# Patient Record
Sex: MALE | Race: WHITE
[De-identification: names, ages, dates, MRNs, and addresses within clinical notes are randomized per-mention and may not be internally consistent; named-entity substitution may affect disease eponyms.]

---

## 2017-01-23 ENCOUNTER — HOSPITAL ENCOUNTER (OUTPATIENT)
Dept: HOSPITAL 19 - COL.LAB | Age: 72
End: 2017-01-23
Attending: ORTHOPAEDIC SURGERY
Payer: MEDICARE

## 2017-01-23 DIAGNOSIS — M17.12: ICD-10-CM

## 2017-01-23 DIAGNOSIS — Z01.812: Primary | ICD-10-CM

## 2017-04-24 ENCOUNTER — HOSPITAL ENCOUNTER (OUTPATIENT)
Dept: HOSPITAL 19 - COL.RAD | Age: 72
End: 2017-04-24
Attending: INTERNAL MEDICINE
Payer: MEDICARE

## 2017-04-24 DIAGNOSIS — K22.8: ICD-10-CM

## 2017-04-24 DIAGNOSIS — R63.4: Primary | ICD-10-CM

## 2017-04-24 DIAGNOSIS — K44.9: ICD-10-CM

## 2017-11-06 ENCOUNTER — HOSPITAL ENCOUNTER (OUTPATIENT)
Dept: HOSPITAL 19 - MKS.ESL.PT | Age: 72
LOS: 73 days | Discharge: HOME | End: 2018-01-18
Attending: PSYCHIATRY & NEUROLOGY
Payer: MEDICARE

## 2017-11-06 DIAGNOSIS — E11.42: Primary | ICD-10-CM

## 2017-11-30 ENCOUNTER — HOSPITAL ENCOUNTER (OUTPATIENT)
Dept: HOSPITAL 19 - MKS.ESL.PT | Age: 72
LOS: 49 days | Discharge: HOME | End: 2018-01-18
Attending: PSYCHIATRY & NEUROLOGY
Payer: MEDICARE

## 2017-11-30 DIAGNOSIS — E11.42: Primary | ICD-10-CM

## 2019-11-20 ENCOUNTER — HOSPITAL ENCOUNTER (OUTPATIENT)
Dept: HOSPITAL 19 - COL.RAD | Age: 74
End: 2019-11-20
Attending: ORTHOPAEDIC SURGERY
Payer: MEDICARE

## 2019-11-20 DIAGNOSIS — M25.512: Primary | ICD-10-CM

## 2024-06-11 NOTE — NUR
An Electronic Health Record (EHR) downtime event occurred during this patient
s care.  For legal medical record information generated during the downtime
period, please reference the patient s legal record information generated
during the downtime period, please reference the patient s legal medical
record.  Paper or scanned documentation has been incorporated into the legal
medical record which is maintained in accordance with Health Information
Management (HIM) and record retention policies

## 2024-06-11 NOTE — NUR
Pt doing okay this morning.  He was sleeping during bedside report.  Pts
daughter and wife are at bedside.  Discussed home medications with them,
updated med rec in the computer.  Pt reports not having any pain.  He does
state that he does not have much of an appetite right now but to leave his
breakfast for now.  Discussed plan of care and answered all questions.

## 2024-06-11 NOTE — NUR
and  student, Katie met with patient and his family to
complete initial intake. Patient's wife, Carey (ph#[phone]) and
daughter, Lucila are at bedside. Patient lives in [place] and sees Dr. Carr
for primary care. Patient does not use any DME and is independent with [employer],
including driving. Patient advised he has a trust that includes [facility],
designating his wife Carey. Patient plans to return home at time of
discharge.
 
Discharge Plan: Home

## 2024-06-11 NOTE — NUR
PT IN BED, SLIGHT HARD OF HEARING. IVF INFUSING TO LT AC WITHOUT PROBLEM. HS
MEDS GIVEN. DENIES PAIN AT THIS TIME. CALLS APPROPRIATELY FOR ASSIST TO
BATHROOM, BED ALARM ON. REMAINS ON SZ PRECAUTIONS, NO SZ ACTIVITY NOTED. HAS
MILD BLE EDEMA. WILL MONITOR FOR CHANGES.

## 2024-06-11 NOTE — NUR
PT has done well today with no complaints of issues.  Pt tolerating ADA diet.
Pt is using his call light when he needs to get up.  He has had several
visitors throughout the day.  Will continue to monitor

## 2024-06-12 NOTE — NUR
pt A&Ox4 sititng up in bed eating breakfast. US completed this morning. vss.
pt denies pain. INT to left ac. pt denies needs at this time. call light in
reach.

## 2024-06-12 NOTE — NUR
PT IN BED, ASSISTED TO BATHROOM, UNSTEADY GAIT. OFFERED WALKER AT THIS TIME,
PT REFUSED. BACK TO BED. HS MEDS GIVEN. PT AWARE HE WILL BE NPO AT MIDNIGHT.
IS ALERT AND ORIENTED X4. BED ALARM SET. SZ PRECAUTIONS IN PLACE. HAS INT TO
LFA. TELEMETRY SHOWS SR. DENIES PAIN AT THIS TIME.

## 2024-06-13 NOTE — NUR
pt back in room from stress test. assisted to recliner with walker. pt denies
pain. assessment complete. vss. no needs at this time. call light in reach.

## 2024-06-13 NOTE — NUR
PT A&O X4 LAYING IN BED. VSS ON ROOM AIR. PT AMBULATED AROUND ROOM WITH SBA.
SHIFT ASSESSMENT COMPLETE & HS MEDS GIVEN. HS BLOOD SUGAR , 2UNITS GAVE
PER MAR. PT DENIES PAIN OR N/V. IV ABX INFUSING TO RT AC VIA PUMP. PT DENYING
FURTHER NEEDS. CALL LIGHT IN REACH & FALL PRECAUTIONS IN PLACE.

## 2024-06-14 NOTE — NUR
PATIENT ALERT AND ORIENTED X4. VSS. PATIENT HERE FOR FEVER OF UNKNOWN ORIGIN,
SEPSIS, SEIZURE LIKE ACTIVITY. PATIENT DENIES ANY PAIN THIS AM. IV TO RIGHT AC
INT AND FLUSHES WELL. PATIENT ON RA. NO FURTHER NEEDS. CALL LIGHT IN REACH.
BED ALARM ON.

## 2024-06-14 NOTE — NUR
PT RESTING IN BED. DENYING PAIN OR N/V THIS MORNING. HAS BEEN NPO SINCE
MIDNIGHT. DENYING FURTHER NEEDS. CALL LIGHT IN REACH.

## 2024-06-14 NOTE — NUR
PATIENT UP TO FLOOR AT APPROXIMATELY 1115. RADIAL BAND TO RIGHT WRIST WITH
10CC AIR. RIGHT FEMORAL SITE WITH ANGIOSEAL INTACT, CLEAN AND DRY. POST OP
VITALS RUNNING. POST OP FLUIDS RUNNING INTO RIGHT AC. PATIENT DENIES ANY PAIN.
CALL LIGHT IN REACH.

## 2024-06-14 NOTE — NUR
CALLED DR AYERS TO INQUIRE ON POSSIBLE MRI IN ORDER TO SCHEDULE LOOP RECORDER
WITH CARDIOLOGY. DR AYERS INFORMED THIS NURSE THAT THERE WOULD BE NO MRI.
CALLED CARDIOLOGYMICHELLE, TO REPORT THAT NO MRI WOULD BE SCHEDULED. LOOP
RECORDER TO BE PLACED SOMETIME TODAY. PATIENT NO LONGER NPO. DIET ORDERED.
DRESSING TO RIGHT FEMORAL SITE CDI, NO HEMATOMA. RIGHT RADIAL SITE WITH 10CC
IN BAND, ATTEMPTED TO RELEASE 2CC AFTER TWO HOURS, SITE BEGAN TO BLEED, ADDED
4 CC IN BAND, BLEEDING STOPPED, RUE CLEANED UP. PATIENT INFORMED. NO FURTHER
NEEDS. CALL LIGHT IN REACH.

## 2024-06-14 NOTE — NUR
RECEIVED PHONE CALL REPORTED LOOP RECORDER PLACED, DRESSING TO LEFT UPPER
CHEST, CDI. PATIENT RESTING IN BED, POST OP VITALS CONTINUE TO RUN FROM HEART
CATH.

## 2024-06-15 NOTE — NUR
was informed by Dr. Villalpando and NELLY MARI that pt will need to
discharge on IV Rocephin once a day for 14 days.  and RN advised pt will
either need a midline or a PICC line, but they are unable to do this until
Monday. Pt also needs to wait two days from today on blood cultures to be
negative.
 
SW met with pt to inquire about his preference. Pt wants whichever "will get
me home the fastest." SW advised that they need to place an access line for
the medication. Pt acknowledged he cannot manage at home and is agreeable to
outpatient services at [facility] for labs, dressing changes, and
medication.
 
SW advised NELLY Jasso of the above. She reports pt will get the PICC/midline in
the hospital on Monday.
 
SW faxed script, face sheet, labs, blood culture, and notes to [facility].
 
Discharge Plan: home with IV ABOX at [facility]

## 2024-06-15 NOTE — NUR
Pt resting in bed with no pain at this time. Dressing to right groin, right
radial site, and loop recorder all with gauze clean and dry. Steady gait
around room with walker and SBA. Pt tolerating deit well and IS encouraged. Pt
asking about discharge, will continue to monitor.

## 2024-06-16 NOTE — NUR
LUCIANO notified by Dr Villalpando that patient is requesting to discharge home today.
Dr. Villalpando confirmed with House Supervisor that patient can receive PICC line
tomorrow in [facility] and is stable for discharge to home tomorrow.
Discharge orders and prescription faxed to [facility].
 
LUCIANO reviewed Medicare IM form with patient and wife.  Patient signed form, copy
provided to patient.
 
Discharge Plan:  Home with OP IV abx

## 2024-06-16 NOTE — NUR
PT HAD DISCHARGE ORDERS. GAVE NOON MEDS. WENT OVER DISCHARGE PAPERWORK WITH
PT AND FAMILY. TOOK OUT PT IV. PCT MARCO A ECORTED PT OUT IN WHEEL CHAIR.

## 2024-06-16 NOTE — NUR
PT SITTING UP IN CHAIR, ALERT AND ORIENTEDX4. NO COMPLAINTS OF PAIN AT THIS
TIME. ASSESSED AND GAVE MORNING MEDS. DRESSING CLEAN DRY INTACT. NO OTHER
COMPLAINTS AT THIS TIME. CALL LIGHT WITHIN REACH.